# Patient Record
Sex: FEMALE | Race: WHITE | NOT HISPANIC OR LATINO | URBAN - METROPOLITAN AREA
[De-identification: names, ages, dates, MRNs, and addresses within clinical notes are randomized per-mention and may not be internally consistent; named-entity substitution may affect disease eponyms.]

---

## 2017-07-12 ENCOUNTER — INPATIENT (INPATIENT)
Facility: HOSPITAL | Age: 57
LOS: 0 days | Discharge: HOME | End: 2017-07-13
Attending: INTERNAL MEDICINE | Admitting: INTERNAL MEDICINE
Payer: MEDICAID

## 2017-07-12 VITALS
RESPIRATION RATE: 20 BRPM | SYSTOLIC BLOOD PRESSURE: 119 MMHG | HEART RATE: 94 BPM | TEMPERATURE: 98 F | DIASTOLIC BLOOD PRESSURE: 52 MMHG | OXYGEN SATURATION: 98 % | HEIGHT: 62 IN | WEIGHT: 274.92 LBS

## 2017-07-12 DIAGNOSIS — Z90.49 ACQUIRED ABSENCE OF OTHER SPECIFIED PARTS OF DIGESTIVE TRACT: Chronic | ICD-10-CM

## 2017-07-12 LAB
ALBUMIN SERPL ELPH-MCNC: 3.4 G/DL — SIGNIFICANT CHANGE UP (ref 3.3–5)
ALP SERPL-CCNC: 119 U/L — SIGNIFICANT CHANGE UP (ref 40–120)
ALT FLD-CCNC: 33 U/L — SIGNIFICANT CHANGE UP (ref 12–78)
ANION GAP SERPL CALC-SCNC: 9 MMOL/L — SIGNIFICANT CHANGE UP (ref 5–17)
APTT BLD: 30.1 SEC — SIGNIFICANT CHANGE UP (ref 27.5–37.4)
AST SERPL-CCNC: 35 U/L — SIGNIFICANT CHANGE UP (ref 15–37)
BILIRUB SERPL-MCNC: 0.1 MG/DL — LOW (ref 0.2–1.2)
BLD GP AB SCN SERPL QL: ABNORMAL
BUN SERPL-MCNC: 10 MG/DL — SIGNIFICANT CHANGE UP (ref 7–23)
CALCIUM SERPL-MCNC: 8.4 MG/DL — LOW (ref 8.5–10.1)
CHLORIDE SERPL-SCNC: 105 MMOL/L — SIGNIFICANT CHANGE UP (ref 96–108)
CO2 SERPL-SCNC: 27 MMOL/L — SIGNIFICANT CHANGE UP (ref 22–31)
CREAT SERPL-MCNC: 0.75 MG/DL — SIGNIFICANT CHANGE UP (ref 0.5–1.3)
DIR ANTIGLOB POLYSPECIFIC INTERPRETATION: SIGNIFICANT CHANGE UP
ELLIPTOCYTES BLD QL SMEAR: SLIGHT — SIGNIFICANT CHANGE UP
EOSINOPHIL NFR BLD AUTO: 1 % — SIGNIFICANT CHANGE UP (ref 0–6)
GLUCOSE SERPL-MCNC: 112 MG/DL — HIGH (ref 70–99)
HCT VFR BLD CALC: 25.5 % — LOW (ref 34.5–45)
HGB BLD-MCNC: 8.6 G/DL — LOW (ref 11.5–15.5)
HYPOCHROMIA BLD QL: SIGNIFICANT CHANGE UP
INR BLD: 1.01 RATIO — SIGNIFICANT CHANGE UP (ref 0.88–1.16)
LYMPHOCYTES # BLD AUTO: 25 % — SIGNIFICANT CHANGE UP (ref 13–44)
MCHC RBC-ENTMCNC: 25.6 PG — LOW (ref 27–34)
MCHC RBC-ENTMCNC: 33.8 GM/DL — SIGNIFICANT CHANGE UP (ref 32–36)
MCV RBC AUTO: 75.7 FL — LOW (ref 80–100)
MICROCYTES BLD QL: SLIGHT — SIGNIFICANT CHANGE UP
MONOCYTES NFR BLD AUTO: 3 % — SIGNIFICANT CHANGE UP (ref 2–14)
NEUTROPHILS NFR BLD AUTO: 71 % — SIGNIFICANT CHANGE UP (ref 43–77)
NT-PROBNP SERPL-SCNC: 87 PG/ML — SIGNIFICANT CHANGE UP (ref 0–125)
PLAT MORPH BLD: NORMAL — SIGNIFICANT CHANGE UP
PLATELET # BLD AUTO: 309 K/UL — SIGNIFICANT CHANGE UP (ref 150–400)
POIKILOCYTOSIS BLD QL AUTO: SLIGHT — SIGNIFICANT CHANGE UP
POLYCHROMASIA BLD QL SMEAR: SLIGHT — SIGNIFICANT CHANGE UP
POTASSIUM SERPL-MCNC: 3.8 MMOL/L — SIGNIFICANT CHANGE UP (ref 3.5–5.3)
POTASSIUM SERPL-SCNC: 3.8 MMOL/L — SIGNIFICANT CHANGE UP (ref 3.5–5.3)
PROT SERPL-MCNC: 6.7 GM/DL — SIGNIFICANT CHANGE UP (ref 6–8.3)
PROTHROM AB SERPL-ACNC: 11 SEC — SIGNIFICANT CHANGE UP (ref 9.8–12.7)
RBC # BLD: 3.37 M/UL — LOW (ref 3.8–5.2)
RBC # FLD: 16.6 % — HIGH (ref 11–15)
RBC BLD AUTO: ABNORMAL
SCHISTOCYTES BLD QL AUTO: SLIGHT — SIGNIFICANT CHANGE UP
SODIUM SERPL-SCNC: 141 MMOL/L — SIGNIFICANT CHANGE UP (ref 135–145)
WBC # BLD: 10.3 K/UL — SIGNIFICANT CHANGE UP (ref 3.8–10.5)
WBC # FLD AUTO: 10.3 K/UL — SIGNIFICANT CHANGE UP (ref 3.8–10.5)

## 2017-07-12 PROCEDURE — 99285 EMERGENCY DEPT VISIT HI MDM: CPT

## 2017-07-12 PROCEDURE — 99223 1ST HOSP IP/OBS HIGH 75: CPT

## 2017-07-12 PROCEDURE — 86077 PHYS BLOOD BANK SERV XMATCH: CPT

## 2017-07-12 PROCEDURE — 71010: CPT | Mod: 26

## 2017-07-12 NOTE — ED PROVIDER NOTE - OBJECTIVE STATEMENT
Pertinent PMH/PSH/FHx/SHx and Review of Systems contained within:  Patient with history of COPD, fibroid uterus with occasional metomenorrhagia, anemia (transfused 4 years ago), fatty tumor of abdomen, PSH of pilonidal cyst removal and cholecystectomy presents to the ED for shortness of breath on exertion.  Started having heavy vaginal bleeding last week which is mostly resolved, prior to that had her period 10 months ago. Comes in with shortness of breath on minimal exertion, per family member at bedside, is pale, says that symptoms are exactly like the last time she needed transfusion.     No fever/chills, No headache/photophobia/eye pain/changes in vision, No ear pain/sore throat/dysphagia, No chest pain/palpitations, no cough/wheeze/stridor, No abdominal pain, No N/V/D, no dysuria/frequency/discharge, No neck/back pain, no rash, no changes in neurological status/function. Pertinent PMH/PSH/FHx/SHx and Review of Systems contained within:  Patient with history of COPD, fibroid uterus with occasional metomenorrhagia, anemia (transfused 4 years ago), fatty tumor of abdomen, PSH of pilonidal cyst removal and cholecystectomy presents to the ED for shortness of breath on exertion.  Started having heavy vaginal bleeding last week which is mostly resolved, prior to that had her period 10 months ago. Comes in with shortness of breath on minimal exertion, per family member at bedside, is pale, says that symptoms are exactly like the last time she needed transfusion.  Patient took nebulizer treatments at home without any relief, has no wheezing in ER.     No fever/chills, No headache/photophobia/eye pain/changes in vision, No ear pain/sore throat/dysphagia, No chest pain/palpitations, no cough/wheeze/stridor, No abdominal pain, No N/V/D, no dysuria/frequency/discharge, No neck/back pain, no rash, no changes in neurological status/function.

## 2017-07-12 NOTE — ED PROVIDER NOTE - OTHER FINDINGS
Continue with katie med rinse  Continue with Flonase.   Follow up in 6 months or as needed    If there are concerns or questions, Call [phone] and ask for nurse   Qtc [phone]

## 2017-07-12 NOTE — ED ADULT TRIAGE NOTE - CHIEF COMPLAINT QUOTE
c/o sob x 2-3 days, hx copd using inhaler more often over past few days without improvement hx uterine fibroid lmp last wednesday with heavy menses at end of cycle now states previous prbc transfusion 4 years secondary to heavy menses denies chest pain slight lightheaded feeling

## 2017-07-12 NOTE — ED PROVIDER NOTE - MEDICAL DECISION MAKING DETAILS
Patient with symptomatic anemia in setting of recent heavy vaginal bleeding which she says is now minimal.  VSS, HR 94.  Will transfuse given history and symptoms, no wheezing on exam or improvement at home with nebs.  Written consent obtained. Patient will need OB consult in morning.  No indication for TXA since bleeding is largely improved.  Patient is to be admitted to the hospital and the case was discussed with the admitting physician.  Any changes in plan, additional imaging/labs, and further work up will be at the discretion of the admitting physician.  Patient remained stable in my care.

## 2017-07-12 NOTE — ED PROVIDER NOTE - CARE PLAN
Suspected to be intrinsic THEO secondary to poor perfusion from STEMI vs  pre-renal. Serum creatinine continues to worsen. Patient still deciding goals of care.  1. Monitor creatinine level.  2. Avoid nephrotoxic agents.  3. Renally dose medications.  4. Diuresis as below.   Principal Discharge DX:	Symptomatic anemia

## 2017-07-13 VITALS
DIASTOLIC BLOOD PRESSURE: 71 MMHG | RESPIRATION RATE: 18 BRPM | SYSTOLIC BLOOD PRESSURE: 121 MMHG | OXYGEN SATURATION: 96 % | HEART RATE: 98 BPM | TEMPERATURE: 98 F

## 2017-07-13 DIAGNOSIS — L05.91 PILONIDAL CYST WITHOUT ABSCESS: Chronic | ICD-10-CM

## 2017-07-13 DIAGNOSIS — J44.1 CHRONIC OBSTRUCTIVE PULMONARY DISEASE WITH (ACUTE) EXACERBATION: ICD-10-CM

## 2017-07-13 DIAGNOSIS — D25.9 LEIOMYOMA OF UTERUS, UNSPECIFIED: ICD-10-CM

## 2017-07-13 DIAGNOSIS — D64.9 ANEMIA, UNSPECIFIED: ICD-10-CM

## 2017-07-13 LAB
ALLERGY+IMMUNOLOGY DIAG STUDY NOTE: SIGNIFICANT CHANGE UP
ANION GAP SERPL CALC-SCNC: 7 MMOL/L — SIGNIFICANT CHANGE UP (ref 5–17)
BUN SERPL-MCNC: 7 MG/DL — SIGNIFICANT CHANGE UP (ref 7–23)
CALCIUM SERPL-MCNC: 8.1 MG/DL — LOW (ref 8.5–10.1)
CHLORIDE SERPL-SCNC: 111 MMOL/L — HIGH (ref 96–108)
CO2 SERPL-SCNC: 25 MMOL/L — SIGNIFICANT CHANGE UP (ref 22–31)
CREAT SERPL-MCNC: 0.68 MG/DL — SIGNIFICANT CHANGE UP (ref 0.5–1.3)
GLUCOSE SERPL-MCNC: 119 MG/DL — HIGH (ref 70–99)
HCT VFR BLD CALC: 23.5 % — LOW (ref 34.5–45)
HCT VFR BLD CALC: 29.7 % — LOW (ref 34.5–45)
HGB BLD-MCNC: 10 G/DL — LOW (ref 11.5–15.5)
HGB BLD-MCNC: 7.9 G/DL — LOW (ref 11.5–15.5)
IRON SATN MFR SERPL: 20 UG/DL — LOW (ref 30–160)
IRON SATN MFR SERPL: 6 % — LOW (ref 14–50)
MCHC RBC-ENTMCNC: 25 PG — LOW (ref 27–34)
MCHC RBC-ENTMCNC: 25.2 PG — LOW (ref 27–34)
MCHC RBC-ENTMCNC: 33.5 GM/DL — SIGNIFICANT CHANGE UP (ref 32–36)
MCHC RBC-ENTMCNC: 33.6 GM/DL — SIGNIFICANT CHANGE UP (ref 32–36)
MCV RBC AUTO: 74.6 FL — LOW (ref 80–100)
MCV RBC AUTO: 75.1 FL — LOW (ref 80–100)
PLATELET # BLD AUTO: 270 K/UL — SIGNIFICANT CHANGE UP (ref 150–400)
PLATELET # BLD AUTO: 328 K/UL — SIGNIFICANT CHANGE UP (ref 150–400)
POTASSIUM SERPL-MCNC: 3.9 MMOL/L — SIGNIFICANT CHANGE UP (ref 3.5–5.3)
POTASSIUM SERPL-SCNC: 3.9 MMOL/L — SIGNIFICANT CHANGE UP (ref 3.5–5.3)
RBC # BLD: 3.15 M/UL — LOW (ref 3.8–5.2)
RBC # BLD: 3.95 M/UL — SIGNIFICANT CHANGE UP (ref 3.8–5.2)
RBC # FLD: 16.1 % — HIGH (ref 11–15)
RBC # FLD: 17 % — HIGH (ref 11–15)
SODIUM SERPL-SCNC: 143 MMOL/L — SIGNIFICANT CHANGE UP (ref 135–145)
TIBC SERPL-MCNC: 359 UG/DL — SIGNIFICANT CHANGE UP (ref 220–430)
UIBC SERPL-MCNC: 339 UG/DL — SIGNIFICANT CHANGE UP (ref 110–370)
WBC # BLD: 6.8 K/UL — SIGNIFICANT CHANGE UP (ref 3.8–10.5)
WBC # BLD: 9.8 K/UL — SIGNIFICANT CHANGE UP (ref 3.8–10.5)
WBC # FLD AUTO: 6.8 K/UL — SIGNIFICANT CHANGE UP (ref 3.8–10.5)
WBC # FLD AUTO: 9.8 K/UL — SIGNIFICANT CHANGE UP (ref 3.8–10.5)

## 2017-07-13 PROCEDURE — 99239 HOSP IP/OBS DSCHRG MGMT >30: CPT

## 2017-07-13 PROCEDURE — 71275 CT ANGIOGRAPHY CHEST: CPT | Mod: 26

## 2017-07-13 PROCEDURE — 93970 EXTREMITY STUDY: CPT | Mod: 26

## 2017-07-13 RX ORDER — PANTOPRAZOLE SODIUM 20 MG/1
40 TABLET, DELAYED RELEASE ORAL
Qty: 0 | Refills: 0 | Status: DISCONTINUED | OUTPATIENT
Start: 2017-07-13 | End: 2017-07-13

## 2017-07-13 RX ORDER — IPRATROPIUM/ALBUTEROL SULFATE 18-103MCG
3 AEROSOL WITH ADAPTER (GRAM) INHALATION EVERY 6 HOURS
Qty: 0 | Refills: 0 | Status: DISCONTINUED | OUTPATIENT
Start: 2017-07-13 | End: 2017-07-13

## 2017-07-13 RX ORDER — SODIUM CHLORIDE 9 MG/ML
1000 INJECTION INTRAMUSCULAR; INTRAVENOUS; SUBCUTANEOUS ONCE
Qty: 0 | Refills: 0 | Status: COMPLETED | OUTPATIENT
Start: 2017-07-13 | End: 2017-07-13

## 2017-07-13 RX ORDER — FERROUS SULFATE 325(65) MG
1 TABLET ORAL
Qty: 30 | Refills: 0 | OUTPATIENT
Start: 2017-07-13

## 2017-07-13 RX ADMIN — Medication 40 MILLIGRAM(S): at 14:14

## 2017-07-13 RX ADMIN — SODIUM CHLORIDE 500 MILLILITER(S): 9 INJECTION INTRAMUSCULAR; INTRAVENOUS; SUBCUTANEOUS at 00:28

## 2017-07-13 RX ADMIN — Medication 40 MILLIGRAM(S): at 05:41

## 2017-07-13 RX ADMIN — PANTOPRAZOLE SODIUM 40 MILLIGRAM(S): 20 TABLET, DELAYED RELEASE ORAL at 06:57

## 2017-07-13 NOTE — DISCHARGE NOTE ADULT - MEDICATION SUMMARY - MEDICATIONS TO TAKE
I will START or STAY ON the medications listed below when I get home from the hospital:    Ventolin HFA 90 mcg/inh inhalation aerosol  -- 1 puff(s) inhaled every 4 hours  -- For inhalation only.  It is very important that you take or use this exactly as directed.  Do not skip doses or discontinue unless directed by your doctor.  Obtain medical advice before taking any non-prescription drugs as some may affect the action of this medication.  Shake well before use.      -- Indication: For COPD exacerbation I will START or STAY ON the medications listed below when I get home from the hospital:    Ventolin HFA 90 mcg/inh inhalation aerosol  -- 1 puff(s) inhaled every 4 hours  -- For inhalation only.  It is very important that you take or use this exactly as directed.  Do not skip doses or discontinue unless directed by your doctor.  Obtain medical advice before taking any non-prescription drugs as some may affect the action of this medication.  Shake well before use.      -- Indication: For COPD exacerbation    ferrous sulfate 325 mg (65 mg elemental iron) oral tablet  -- 1 tab(s) by mouth once a day  -- Check with your doctor before becoming pregnant.  Do not chew, break, or crush.  May discolor urine or feces.    -- Indication: For Anemia

## 2017-07-13 NOTE — H&P ADULT - ASSESSMENT
55 y/o woman with acute SOB, no response to nebulizers at home and no wheezing, no cough or fever or history of CHF, chronically anemic.  With family history needed to R/O PE; CTA was negative. SOB probably due to COPD exacerbation and anemia.

## 2017-07-13 NOTE — DISCHARGE NOTE ADULT - PLAN OF CARE
Resolved s/p 2 units PRBCs Follow up with outpatient primary care provider- Dr. Payton of [place] in 1 week. See above -Continue home med- inhaler as needed, and follow up with PCP in 1 week Follow up with outpatient primary care provider- Dr. Payton of [place] in 1 week.  OTC iron supplementation daily. Follow up with Gynecologist as outpatient for fibroids and episodes of heavy menstrual bleeding in 1 week. Follow up with outpatient primary care provider- Dr. Payton of [place] in 1 week.  Take Ferrous Sulfate 325mg daily Follow up with Gynecologist as outpatient for fibroids and episodes of heavy menstrual bleeding in 1 week.  Ferrous sulfate daily Follow up with outpatient primary care provider- Dr. Payton of [place] in 1 week.  Take Ferrous Sulfate 325mg daily  Return to the ER or notify physician sooner if you experience recurrence of symptoms, heavy menstrual bleeding or other concerns

## 2017-07-13 NOTE — H&P ADULT - HISTORY OF PRESENT ILLNESS
57 y/o Female with history of COPD, fibroid uterus with occasional metromenorrhagia, anemia (transfused 4 years ago), fatty tumor of abdomen, PSH of pilonidal cyst removal and cholecystectomy presents to the ED for shortness of breath on exertion.  Started having heavy vaginal bleeding last week which is mostly resolved, prior to that had her period 10 months ago. Comes in with shortness of breath on minimal exertion, per family member at bedside, is pale, says that symptoms are exactly like the last time she needed transfusion.  Patient took nebulizer treatments at home without any relief, has no wheezing in ER. Has family history of PE in father. Has also noted leg swelling recently, no prolonged trips, use estrogens, is non smoker. No chest pain or palpitations, cough or hemoptysis. No fever/chills, No headache/photophobia/eye pain/changes in vision, No ear pain/sore throat/dysphagia, No chest pain/palpitations, no cough/wheeze/stridor, No abdominal pain, No N/V/D, no dysuria/frequency/discharge, No neck/back pain, no rash, no changes in neurological status/function.

## 2017-07-13 NOTE — DISCHARGE NOTE ADULT - CARE PLAN
Principal Discharge DX:	Symptomatic anemia  Goal:	Resolved s/p 2 units PRBCs  Instructions for follow-up, activity and diet:	Follow up with outpatient primary care provider- Dr. Payton of [place] in 1 week.  Secondary Diagnosis:	Uterine leiomyoma, unspecified location  Instructions for follow-up, activity and diet:	See above  Secondary Diagnosis:	COPD exacerbation  Instructions for follow-up, activity and diet:	-Continue home med- inhaler as needed, and follow up with PCP in 1 week Principal Discharge DX:	Symptomatic anemia  Goal:	Resolved s/p 2 units PRBCs  Instructions for follow-up, activity and diet:	Follow up with outpatient primary care provider- Dr. Payton of [place] in 1 week.  OTC iron supplementation daily.  Secondary Diagnosis:	Uterine leiomyoma, unspecified location  Instructions for follow-up, activity and diet:	Follow up with Gynecologist as outpatient for fibroids and episodes of heavy menstrual bleeding in 1 week.  Secondary Diagnosis:	COPD exacerbation  Instructions for follow-up, activity and diet:	-Continue home med- inhaler as needed, and follow up with PCP in 1 week Principal Discharge DX:	Symptomatic anemia  Goal:	Resolved s/p 2 units PRBCs  Instructions for follow-up, activity and diet:	Follow up with outpatient primary care provider- Dr. Payton of [place] in 1 week.  OTC iron supplementation daily.  Secondary Diagnosis:	Uterine leiomyoma, unspecified location  Instructions for follow-up, activity and diet:	Follow up with Gynecologist as outpatient for fibroids and episodes of heavy menstrual bleeding in 1 week.  Secondary Diagnosis:	COPD exacerbation  Instructions for follow-up, activity and diet:	-Continue home med- inhaler as needed, and follow up with PCP in 1 week Principal Discharge DX:	Symptomatic anemia  Goal:	Resolved s/p 2 units PRBCs  Instructions for follow-up, activity and diet:	Follow up with outpatient primary care provider- Dr. Payton of [place] in 1 week.  Take Ferrous Sulfate 325mg daily  Secondary Diagnosis:	Uterine leiomyoma, unspecified location  Instructions for follow-up, activity and diet:	Follow up with Gynecologist as outpatient for fibroids and episodes of heavy menstrual bleeding in 1 week.  Ferrous sulfate daily  Secondary Diagnosis:	COPD exacerbation  Instructions for follow-up, activity and diet:	-Continue home med- inhaler as needed, and follow up with PCP in 1 week Principal Discharge DX:	Symptomatic anemia  Goal:	Resolved s/p 2 units PRBCs  Instructions for follow-up, activity and diet:	Follow up with outpatient primary care provider- Dr. Payton of [place] in 1 week.  Take Ferrous Sulfate 325mg daily  Secondary Diagnosis:	Uterine leiomyoma, unspecified location  Instructions for follow-up, activity and diet:	Follow up with Gynecologist as outpatient for fibroids and episodes of heavy menstrual bleeding in 1 week.  Ferrous sulfate daily  Secondary Diagnosis:	COPD exacerbation  Instructions for follow-up, activity and diet:	-Continue home med- inhaler as needed, and follow up with PCP in 1 week Principal Discharge DX:	Symptomatic anemia  Goal:	Resolved s/p 2 units PRBCs  Instructions for follow-up, activity and diet:	Follow up with outpatient primary care provider- Dr. Payton of [place] in 1 week.  Take Ferrous Sulfate 325mg daily  Return to the ER or notify physician sooner if you experience recurrence of symptoms, heavy menstrual bleeding or other concerns  Secondary Diagnosis:	Uterine leiomyoma, unspecified location  Instructions for follow-up, activity and diet:	Follow up with Gynecologist as outpatient for fibroids and episodes of heavy menstrual bleeding in 1 week.  Ferrous sulfate daily  Secondary Diagnosis:	COPD exacerbation  Instructions for follow-up, activity and diet:	-Continue home med- inhaler as needed, and follow up with PCP in 1 week Principal Discharge DX:	Symptomatic anemia  Goal:	Resolved s/p 2 units PRBCs  Instructions for follow-up, activity and diet:	Follow up with outpatient primary care provider- Dr. Payton of [place] in 1 week.  Take Ferrous Sulfate 325mg daily  Return to the ER or notify physician sooner if you experience recurrence of symptoms, heavy menstrual bleeding or other concerns  Secondary Diagnosis:	Uterine leiomyoma, unspecified location  Instructions for follow-up, activity and diet:	Follow up with Gynecologist as outpatient for fibroids and episodes of heavy menstrual bleeding in 1 week.  Ferrous sulfate daily  Secondary Diagnosis:	COPD exacerbation  Instructions for follow-up, activity and diet:	-Continue home med- inhaler as needed, and follow up with PCP in 1 week Principal Discharge DX:	Symptomatic anemia  Goal:	Resolved s/p 2 units PRBCs  Instructions for follow-up, activity and diet:	Follow up with outpatient primary care provider- Dr. Payton of [place] in 1 week.  Take Ferrous Sulfate 325mg daily  Return to the ER or notify physician sooner if you experience recurrence of symptoms, heavy menstrual bleeding or other concerns  Secondary Diagnosis:	Uterine leiomyoma, unspecified location  Instructions for follow-up, activity and diet:	Follow up with Gynecologist as outpatient for fibroids and episodes of heavy menstrual bleeding in 1 week.  Ferrous sulfate daily  Secondary Diagnosis:	COPD exacerbation  Instructions for follow-up, activity and diet:	-Continue home med- inhaler as needed, and follow up with PCP in 1 week

## 2017-07-13 NOTE — DISCHARGE NOTE ADULT - ADDITIONAL INSTRUCTIONS
Please return to the ER or notify physician sooner if you experience a recurrence of symptoms or other concerns/new symptoms including Chest pain, shortness of breath, dizziness, fever >101, chills, excessive abdominal pain, nausea/vomiting. Please return to the ER or notify physician sooner if you experience a recurrence of symptoms or other concerns/new symptoms including Chest pain, shortness of breath, dizziness, fever >101, chills, excessive abdominal pain, nausea/vomiting.    Patient may return to work on Sunday June 16, 2017.

## 2017-07-13 NOTE — DISCHARGE NOTE ADULT - NS AS ACTIVITY OBS
No Heavy lifting/straining/Do not drive or operate machinery/Return to Work/School allowed/Stairs allowed/Walking-Outdoors allowed/Driving allowed/Walking-Indoors allowed/Bathing allowed/Showering allowed

## 2017-07-13 NOTE — DISCHARGE NOTE ADULT - HOSPITAL COURSE
57 y/o woman with acute SOB, no response to nebulizers at home and no wheezing, no cough or fever or history of CHF, chronically anemic.  With family history needed to R/O PE; CTA was negative. Patient was admitted with SOB; probably due to COPD exacerbation and anemia with an H/H of 8.6/25.5-->7.9/23.5. Patient received Solumedrol, duoneb, IVF, and 2 units PRBCs. Repeat H/H s/p 2 units is 57 y/o woman with acute SOB, no response to nebulizers at home and no wheezing, no cough or fever or history of CHF, chronically anemic.  With family history needed to R/O PE; CTA was negative. Patient was admitted with SOB; probably due to COPD exacerbation and anemia with an H/H of 8.6/25.5-->7.9/23.5. Patient received Solumedrol, duoneb, IVF, and 2 units PRBCs. Repeat H/H s/p 2 units is   Patient improved after transfusion, resolution of shortness of breath, patient now hemodynamically stable for discharge to home. 57 y/o woman with acute SOB, no response to nebulizers at home and no wheezing, no cough or fever or history of CHF, chronically anemic.  With family history needed to R/O PE; CTA was negative. Patient was admitted with SOB; probably due to COPD exacerbation and anemia with an H/H of 8.6/25.5-->7.9/23.5. Patient received Solumedrol, duoneb, IVF, and 2 units PRBCs. Repeat H/H stable s/p 2 units -10/29.7.  Patient improved after transfusion, resolution of shortness of breath, patient now hemodynamically stable for discharge to home. 57 y/o woman with acute SOB, no response to nebulizers at home and no wheezing, no cough or fever or history of CHF, chronically anemic.  With family history needed to R/O PE; CTA was negative. Patient was admitted with SOB; probably due to COPD exacerbation and anemia with an H/H of 8.6/25.5-->7.9/23.5. Patient received Solumedrol, duoneb, IVF, and 2 units PRBCs. Repeat H/H stable s/p 2 units -10/29.7.  Patient improved after transfusion, resolution of shortness of breath, patient now hemodynamically stable for discharge to home.   Patient will follow up with PCP in 1 week and see a Gynecologist following her PCP apt.     May return to work Sunday, June 16, 2017. 57 y/o woman with acute SOB, no response to nebulizers at home and no wheezing, no cough or fever or history of CHF, chronically anemic.  With family history needed to R/O PE; CTA was negative. Patient was admitted with SOB; probably due to COPD exacerbation and anemia with an H/H of 8.6/25.5-->7.9/23.5. Patient received Solumedrol, duoneb, IVF, and 2 units PRBCs. Repeat H/H stable s/p 2 units -10/29.7.  Patient improved after transfusion, resolution of shortness of breath, patient now hemodynamically stable for discharge to home.   Patient will follow up with PCP in 1 week and see a Gynecologist following her PCP apt.

## 2017-07-13 NOTE — DISCHARGE NOTE ADULT - PATIENT PORTAL LINK FT
“You can access the Follow[facility] Patient Portal, offered by [facility], by registering with the following website: [email or web address]”

## 2017-07-13 NOTE — ED ADULT NURSE REASSESSMENT NOTE - NS ED NURSE REASSESS COMMENT FT1
Pt A&Ox4. No distress noted. No c/o made. Decrease in /58 noted. Dr. Baca notfied of PRBC being delay as per Blood bank. Will start NS bolus as per MD. Will continue to monitor

## 2017-07-13 NOTE — H&P ADULT - NSHPREVIEWOFSYSTEMS_GEN_ALL_CORE
REVIEW OF SYSTEMS:  CONSTITUTIONAL: No fever, weight loss, or fatigue  EYES: No eye pain, visual disturbances, or discharge  ENMT:  No difficulty hearing, tinnitus, vertigo; No sinus or throat pain  NECK: No pain or stiffness  BREASTS: No pain, masses, or nipple discharge  RESPIRATORY: No cough, wheezing, chills or hemoptysis; +shortness of breath  CARDIOVASCULAR: No chest pain, palpitations, dizziness, + leg swelling  GASTROINTESTINAL: No abdominal or epigastric pain. No nausea, vomiting, or hematemesis; No diarrhea or constipation. No melena or hematochezia.  GENITOURINARY: No dysuria, frequency, hematuria, or incontinence, + heavy vaginal bleeding, hx fibroids  NEUROLOGICAL: No headaches, memory loss, loss of strength, numbness, or tremors  SKIN: No itching, burning, rashes, or lesions   LYMPH NODES: No enlarged glands  ENDOCRINE: No heat or cold intolerance; No hair loss  MUSCULOSKELETAL: No joint pain or swelling; No muscle, back, or extremity pain  PSYCHIATRIC: No depression, anxiety, mood swings, or difficulty sleeping  HEME/LYMPH: No easy bruising, or bleeding gums  ALLERGY AND IMMUNOLOGIC: No hives or eczema

## 2017-07-13 NOTE — H&P ADULT - NSHPLABSRESULTS_GEN_ALL_CORE
T(C): 37 (12 Jul 2017 23:42), Max: 37 (12 Jul 2017 23:42)  T(F): 98.6 (12 Jul 2017 23:42), Max: 98.6 (12 Jul 2017 23:42)  HR: 88 (12 Jul 2017 23:42) (88 - 94)  BP: 100/52 (12 Jul 2017 23:42) (100/52 - 119/52)  BP(mean): --  RR: 18 (12 Jul 2017 23:42) (18 - 20)  SpO2: 94% (12 Jul 2017 23:42) (94% - 98%)I&O's Summary      LABS:                        8.6    10.3  )-----------( 309      ( 12 Jul 2017 20:59 )             25.5     07-12    141  |  105  |  10  ----------------------------<  112<H>  3.8   |  27  |  0.75    Ca    8.4<L>      12 Jul 2017 20:59    TPro  6.7  /  Alb  3.4  /  TBili  0.1<L>  /  DBili  x   /  AST  35  /  ALT  33  /  AlkPhos  119  07-12    PT/INR - ( 12 Jul 2017 22:46 )   PT: 11.0 sec;   INR: 1.01 ratio         PTT - ( 12 Jul 2017 22:46 )  PTT:30.1 sec        RADIOLOGY & ADDITIONAL TESTS:  < from: CT Angio Chest w/ IV Cont (07.13.17 @ 01:16) >    Negative for pulmonary emboli. Evaluation of distal segmental and   subsegmental vessels is suboptimal due to contrast bolus timing..    Imaging Personally Reviewed:  [ x] YES  [ ] NO    EKG: sinus@90 no acute ST T changes

## 2017-07-14 LAB — FERRITIN SERPL-MCNC: 25 NG/ML — SIGNIFICANT CHANGE UP (ref 15–150)

## 2017-07-18 DIAGNOSIS — D25.9 LEIOMYOMA OF UTERUS, UNSPECIFIED: ICD-10-CM

## 2017-07-18 DIAGNOSIS — Z90.49 ACQUIRED ABSENCE OF OTHER SPECIFIED PARTS OF DIGESTIVE TRACT: ICD-10-CM

## 2017-07-18 DIAGNOSIS — J44.1 CHRONIC OBSTRUCTIVE PULMONARY DISEASE WITH (ACUTE) EXACERBATION: ICD-10-CM

## 2017-07-18 DIAGNOSIS — D64.9 ANEMIA, UNSPECIFIED: ICD-10-CM

## 2017-07-18 DIAGNOSIS — Z87.891 PERSONAL HISTORY OF NICOTINE DEPENDENCE: ICD-10-CM

## 2017-07-18 DIAGNOSIS — R06.02 SHORTNESS OF BREATH: ICD-10-CM

## 2019-02-21 NOTE — ED PROVIDER NOTE - NSTOBACCO TYPE_GEN_A_CORE_RD
Cigarettes Bilobed Flap Text: The defect edges were debeveled with a #15 scalpel blade.  Given the location of the defect and the proximity to free margins a bilobe flap was deemed most appropriate.  Using a sterile surgical marker, an appropriate bilobe flap drawn around the defect.    The area thus outlined was incised deep to adipose tissue with a #15 scalpel blade.  The skin margins were undermined to an appropriate distance in all directions utilizing iris scissors.

## 2023-11-25 NOTE — PATIENT PROFILE ADULT. - TRANSFUSION PREMEDICATION REQUIRED
none I personally saw the patient with the PA and performed a substantive portion of the visit including all aspects of the medical decision making.

## 2024-04-03 NOTE — PATIENT PROFILE ADULT. - NSTOBACCO TYPE_GEN_A_CORE_RD
Stent education/card/handout given to patient. Discussed importance of taking medication as prescribed/following up with physician appointments. Discussed cardiac rehab.    Cigarettes

## 2024-10-21 NOTE — ED PROVIDER NOTE - EYES, MLM
Scheduled to see dr lacy in few weeks I did suggest she obtain referral for neurosurgeon as well given the severity of her symptoms.    Clear bilaterally, pupils equal, round and reactive to light.

## 2025-07-11 NOTE — ED ADULT NURSE NOTE - PSH
Health Maintenance       Respiratory Syncytial Virus (RSV) Vaccine 60+ (1 - Risk 60-74 years 1-dose series)  Never done    Traditional Medicare- Medicare Wellness Visit (Yearly)  Scheduled for 8/4/2025           Following review of the above:  Health maintenance topics up to date.    Note: Refer to final orders and clinician documentation.    Review Flowsheet  More data exists         7/11/2025   PHQ 2/9 Score   Adult PHQ 2 Score 0   Adult PHQ 2 Interpretation No further screening needed   Little interest or pleasure in activity? Not at all   Feeling down, depressed or hopeless? Not at all         S/P cholecystectomy